# Patient Record
Sex: FEMALE | Race: WHITE | URBAN - METROPOLITAN AREA
[De-identification: names, ages, dates, MRNs, and addresses within clinical notes are randomized per-mention and may not be internally consistent; named-entity substitution may affect disease eponyms.]

---

## 2017-01-27 ENCOUNTER — THERAPY VISIT (OUTPATIENT)
Dept: PHYSICAL THERAPY | Facility: CLINIC | Age: 57
End: 2017-01-27
Payer: COMMERCIAL

## 2017-01-27 DIAGNOSIS — M25.572 CHRONIC PAIN OF LEFT ANKLE: Primary | ICD-10-CM

## 2017-01-27 DIAGNOSIS — G89.29 CHRONIC PAIN OF LEFT ANKLE: Primary | ICD-10-CM

## 2017-01-27 PROCEDURE — 97161 PT EVAL LOW COMPLEX 20 MIN: CPT | Mod: GP | Performed by: PHYSICAL THERAPIST

## 2017-01-27 PROCEDURE — 97110 THERAPEUTIC EXERCISES: CPT | Mod: GP | Performed by: PHYSICAL THERAPIST

## 2017-01-27 NOTE — Clinical Note
[facility]  [address] [address]  [address]  [place] [address]  [phone]    2017    Re: Dasha Rich   :   1960  MRN:  [id number]   REFERRING PHYSICIAN:   Garcia Martinez    [facility]  Date of Initial Evaluation:  2017  Visits:  Rxs Used: 1  Reason for Referral:  Chronic pain of left ankle    [facility] Initial Evaluation  Subjective:  Dasha Rich is a 56 year old female with a left ankle condition.  Condition occurred with:  Repetition/overuse (with running).  Condition occurred: during recreation/sport.  This is a chronic condition  2014- ankle started bothering her with running and has slowly gotten worse. It now bothers her with walking, mostly after the activity versus of during. Referred to PT on 17.    Patient reports pain:  Medial, posterior and lateral (posterior to both malleoli and achilles tendon).  Radiates to:  No radiation.  Pain is described as aching and burning and is intermittent and reported as 2/10 (5/10 at worst).  Associated symptoms:  Loss of motion/stiffness and loss of strength. Pain is worse in the P.M..  Symptoms are exacerbated by running, walking and activity and relieved by activity/movement, NSAID's and ice (feels better once she's been moving around a little, minimal relief with ice & ibuprofen).  Since onset symptoms are gradually worsening.  Special tests:  X-ray (no significant findings).      General health as reported by patient is excellent.  Pertinent medical history includes:  Thyroid problems and menopausal.  Medical allergies: no.  Other surgeries include:  None reported.  Current medications:  Thyroid medication and other (zyrtec, vitamin D).  Current occupation .  Patient is working in normal job without restrictions.  Primary job tasks include:  Prolonged sitting and other (computer work).  Barriers include:  None as  reported by patient.  Red flags:  None as reported by patient.   Lower Extremity:  Decreased left lower extremity flexibility:Soleus  Decreased right lower extremity flexibility:  Soleus    Ankle/Foot Evaluation  ROM:  AROM is normal.  Strength:    Dorsiflexion:  Left: 4+/5      Pain:-   Right: 5/5    Pain:-  Plantarflexion: Left: 4/5    Pain:-   Right: 5/5   Pain:-  Inversion:Left: 5/5   Pain:-     Right: 5/5   Pain:-  Eversion:Left: 5/5   Pain:-  Right: 5/5   Pain:-  Re: Dasha SALVADOR Hilario   :   1960    Strength wnl ankle: PF tested in non WB.  LIGAMENT TESTING:   Anterior Drawer (ATF) Left: neg   Anterior Drawer (ATF) Right: neg  Posterior Drawer (PTF) Left: pos   Posterior Drawer (PTF) Right: neg  Varus Stress (Calc Fib) Left: neg    Varus Stress (Calc Fib) Right: neg  Valgus Stress (Deltoid) Left: neg    Valgus Stress (Deltoid) Right: neg  PALPATION:   Left ankle tenderness present at:  gastroc/soleus; achilles tendon; anterior talofibular ligament; posterior talofibular ligament and anterior tibiofibular ligament  Left ankle tenderness not present at:   anterior tibialis; posterior tibialis; deltoid ligament or calcaneofibular ligament  Right ankle tenderness present at:   gastroc/soleus  Right ankle tenderness not present at:  achilles tendon; anterior tibialis or posterior tibialis  EDEMA: normal  MOBILITY TESTING:   Subtalar Left: normal    Subtalar Right: normal    Assessment/Plan:      Patient is a 56 year old female with left ankle complaints.    Patient has the following significant findings with corresponding treatment plan.                Diagnosis 1:  L ankle pain  Pain -  manual therapy, self management, education and home program  Decreased ROM/flexibility - manual therapy, therapeutic exercise and home program  Decreased strength - therapeutic exercise, therapeutic activities and home program  Decreased proprioception - neuro re-education, therapeutic activities and home program  Decreased  function - therapeutic activities and home program    Therapy Evaluation Codes:   1) History comprised of:   Personal factors that impact the plan of care:      None.    Comorbidity factors that impact the plan of care are:      None.     Medications impacting care: None.  2) Examination of Body Systems comprised of:   Body structures and functions that impact the plan of care:      Ankle.   Activity limitations that impact the plan of care are:      Running and Walking.  3) Clinical presentation characteristics are:   Stable/Uncomplicated.  4) Decision-Making    Low complexity using standardized patient assessment instrument and/or measureable assessment of functional outcome.  Cumulative Therapy Evaluation is: Low complexity.  Re: Dasha Rich   :   1960    Previous and current functional limitations:  (See Goal Flow Sheet for this information)    Short term and Long term goals: (See Goal Flow Sheet for this information)   Communication ability:  Patient appears to be able to clearly communicate and understand verbal and written communication and follow directions correctly.  Treatment Explanation - The following has been discussed with the patient:   RX ordered/plan of care  Anticipated outcomes  Possible risks and side effects  This patient would benefit from PT intervention to resume normal activities.   Rehab potential is good.  Frequency:  2 X a month, once daily  Duration:  for 2 months  Discharge Plan:  Achieve all LTG.  Independent in home treatment program.  Reach maximal therapeutic benefit.    Please refer to the daily flowsheet for treatment today, total treatment time and time spent performing 1:1 timed codes.             Thank you for your referral.              INQUIRIES  Therapist: Bear Valero, PT  [facility]  [address]  [place] [address]  Phone: [phone]  Fax: [phone]

## 2017-01-27 NOTE — PROGRESS NOTES
[facility] Initial Evaluation  Subjective:    Dasha Rich is a 56 year old female with a left ankle condition.  Condition occurred with:  Repetition/overuse (with running).  Condition occurred: during recreation/sport.  This is a chronic condition  Jan 2014- ankle started bothering her with running and has slowly gotten worse. It now bothers her with walking, mostly after the activity versus of during. Referred to PT on 1/13/17.    Patient reports pain:  Medial, posterior and lateral (posterior to both malleoli and achilles tendon).  Radiates to:  No radiation.  Pain is described as aching and burning and is intermittent and reported as 2/10 (5/10 at worst).  Associated symptoms:  Loss of motion/stiffness and loss of strength. Pain is worse in the P.M..  Symptoms are exacerbated by running, walking and activity and relieved by activity/movement, NSAID's and ice (feels better once she's been moving around a little, minimal relief with ice & ibuprofen).  Since onset symptoms are gradually worsening.  Special tests:  X-ray (no significant findings).      General health as reported by patient is excellent.  Pertinent medical history includes:  Thyroid problems and menopausal.  Medical allergies: no.  Other surgeries include:  None reported.  Current medications:  Thyroid medication and other (zyrtec, vitamin D).  Current occupation   .  Patient is working in normal job without restrictions.  Primary job tasks include:  Prolonged sitting and other (computer work).    Barriers include:  None as reported by patient.    Red flags:  None as reported by patient.                      Objective:          Flexibility/Screens:       Lower Extremity:  Decreased left lower extremity flexibility:Soleus    Decreased right lower extremity flexibility:  Soleus          Ankle/Foot Evaluation  ROM:  AROM is normal.      Strength:    Dorsiflexion:  Left: 4+/5      Pain:-   Right: 5/5     Pain:-  Plantarflexion: Left: 4/5    Pain:-   Right: 5/5   Pain:-  Inversion:Left: 5/5   Pain:-     Right: 5/5   Pain:-  Eversion:Left: 5/5   Pain:-  Right: 5/5   Pain:-              Strength wnl ankle: PF tested in non WB.  LIGAMENT TESTING:   Anterior Drawer (ATF) Left: neg   Anterior Drawer (ATF) Right: neg  Posterior Drawer (PTF) Left: pos   Posterior Drawer (PTF) Right: neg  Varus Stress (Calc Fib) Left: neg    Varus Stress (Calc Fib) Right: neg  Valgus Stress (Deltoid) Left: neg    Valgus Stress (Deltoid) Right: neg        PALPATION:   Left ankle tenderness present at:  gastroc/soleus; achilles tendon; anterior talofibular ligament; posterior talofibular ligament and anterior tibiofibular ligament  Left ankle tenderness not present at:   anterior tibialis; posterior tibialis; deltoid ligament or calcaneofibular ligament  Right ankle tenderness present at:   gastroc/soleus  Right ankle tenderness not present at:  achilles tendon; anterior tibialis or posterior tibialis  EDEMA: normal          MOBILITY TESTING:         Subtalar Left: normal    Subtalar Right: normal                                                        General     ROS    Assessment/Plan:      Patient is a 56 year old female with left ankle complaints.    Patient has the following significant findings with corresponding treatment plan.                Diagnosis 1:  L ankle pain  Pain -  manual therapy, self management, education and home program  Decreased ROM/flexibility - manual therapy, therapeutic exercise and home program  Decreased strength - therapeutic exercise, therapeutic activities and home program  Decreased proprioception - neuro re-education, therapeutic activities and home program  Decreased function - therapeutic activities and home program    Therapy Evaluation Codes:   1) History comprised of:   Personal factors that impact the plan of care:      None.    Comorbidity factors that impact the plan of care are:      None.      Medications impacting care: None.  2) Examination of Body Systems comprised of:   Body structures and functions that impact the plan of care:      Ankle.   Activity limitations that impact the plan of care are:      Running and Walking.  3) Clinical presentation characteristics are:   Stable/Uncomplicated.  4) Decision-Making    Low complexity using standardized patient assessment instrument and/or measureable assessment of functional outcome.  Cumulative Therapy Evaluation is: Low complexity.    Previous and current functional limitations:  (See Goal Flow Sheet for this information)    Short term and Long term goals: (See Goal Flow Sheet for this information)     Communication ability:  Patient appears to be able to clearly communicate and understand verbal and written communication and follow directions correctly.  Treatment Explanation - The following has been discussed with the patient:   RX ordered/plan of care  Anticipated outcomes  Possible risks and side effects  This patient would benefit from PT intervention to resume normal activities.   Rehab potential is good.    Frequency:  2 X a month, once daily  Duration:  for 2 months  Discharge Plan:  Achieve all LTG.  Independent in home treatment program.  Reach maximal therapeutic benefit.    Please refer to the daily flowsheet for treatment today, total treatment time and time spent performing 1:1 timed codes.

## 2017-02-10 ENCOUNTER — THERAPY VISIT (OUTPATIENT)
Dept: PHYSICAL THERAPY | Facility: CLINIC | Age: 57
End: 2017-02-10
Payer: COMMERCIAL

## 2017-02-10 DIAGNOSIS — G89.29 CHRONIC PAIN OF LEFT ANKLE: Primary | ICD-10-CM

## 2017-02-10 DIAGNOSIS — M25.572 CHRONIC PAIN OF LEFT ANKLE: Primary | ICD-10-CM

## 2017-02-10 PROCEDURE — 97110 THERAPEUTIC EXERCISES: CPT | Mod: GP | Performed by: PHYSICAL THERAPIST

## 2017-02-10 PROCEDURE — 97112 NEUROMUSCULAR REEDUCATION: CPT | Mod: GP | Performed by: PHYSICAL THERAPIST

## 2017-02-10 NOTE — PROGRESS NOTES
Subjective:    HPI                    Objective:    System    Physical Exam    General     ROS    Assessment/Plan:      SUBJECTIVE  Subjective: Feels her pain has decreased since last visit. Has been doing her exercises and stopped walking on an inclined treadmill and feels those are helping. Has pain less often and it is less intense.   Current Pain level: 3/10   Changes in function:  Yes (See Goal flowsheet attached for changes in current functional level)     Adverse reaction to treatment or activity:  None    OBJECTIVE  Objective: 5/5 strength of L ankle with all motions. Decreased tightness in gastroc/soleus. SLS improved compared to last PT visit.    ASSESSMENT  Dasha continues to require intervention to meet STG and LTG's: PT  Patient is progressing as expected.  Response to therapy has shown an improvement in  pain level, balance and function  Progress made towards STG/LTG?  Yes (See Goal flowsheet attached for updates on achievement of STG and LTG)    PLAN  Current treatment program is being advanced to more complex exercises.    PTA/ATC plan:  N/A    Please refer to the daily flowsheet for treatment today, total treatment time and time spent performing 1:1 timed codes.

## 2017-10-08 ENCOUNTER — HEALTH MAINTENANCE LETTER (OUTPATIENT)
Age: 57
End: 2017-10-08

## 2020-02-24 ENCOUNTER — HEALTH MAINTENANCE LETTER (OUTPATIENT)
Age: 60
End: 2020-02-24

## 2020-12-13 ENCOUNTER — HEALTH MAINTENANCE LETTER (OUTPATIENT)
Age: 60
End: 2020-12-13

## 2021-04-17 ENCOUNTER — HEALTH MAINTENANCE LETTER (OUTPATIENT)
Age: 61
End: 2021-04-17

## 2021-09-26 ENCOUNTER — HEALTH MAINTENANCE LETTER (OUTPATIENT)
Age: 61
End: 2021-09-26

## 2022-03-13 ENCOUNTER — HEALTH MAINTENANCE LETTER (OUTPATIENT)
Age: 62
End: 2022-03-13

## 2022-05-08 ENCOUNTER — HEALTH MAINTENANCE LETTER (OUTPATIENT)
Age: 62
End: 2022-05-08

## 2023-04-23 ENCOUNTER — HEALTH MAINTENANCE LETTER (OUTPATIENT)
Age: 63
End: 2023-04-23

## 2023-06-02 ENCOUNTER — HEALTH MAINTENANCE LETTER (OUTPATIENT)
Age: 63
End: 2023-06-02